# Patient Record
Sex: FEMALE | Race: WHITE | Employment: FULL TIME | URBAN - METROPOLITAN AREA
[De-identification: names, ages, dates, MRNs, and addresses within clinical notes are randomized per-mention and may not be internally consistent; named-entity substitution may affect disease eponyms.]

---

## 2017-10-03 ENCOUNTER — TELEPHONE (OUTPATIENT)
Dept: INTERNAL MEDICINE | Age: 32
End: 2017-10-03

## 2017-10-16 ENCOUNTER — TELEPHONE (OUTPATIENT)
Dept: INTERNAL MEDICINE | Age: 32
End: 2017-10-16

## 2018-10-03 ENCOUNTER — HOSPITAL ENCOUNTER (OUTPATIENT)
Age: 33
Discharge: HOME | End: 2018-10-03
Payer: COMMERCIAL

## 2018-10-03 ENCOUNTER — OFFICE VISIT (OUTPATIENT)
Dept: INTERNAL MEDICINE CLINIC | Age: 33
End: 2018-10-03
Payer: COMMERCIAL

## 2018-10-03 ENCOUNTER — HOSPITAL ENCOUNTER (OUTPATIENT)
Dept: GENERAL RADIOLOGY | Age: 33
Discharge: HOME | End: 2018-10-03
Payer: COMMERCIAL

## 2018-10-03 VITALS — WEIGHT: 152 LBS | DIASTOLIC BLOOD PRESSURE: 70 MMHG | SYSTOLIC BLOOD PRESSURE: 124 MMHG

## 2018-10-03 DIAGNOSIS — M54.50 CHRONIC MIDLINE LOW BACK PAIN WITHOUT SCIATICA: ICD-10-CM

## 2018-10-03 DIAGNOSIS — Z00.00 ANNUAL PHYSICAL EXAM: Primary | ICD-10-CM

## 2018-10-03 DIAGNOSIS — G89.29 CHRONIC MIDLINE LOW BACK PAIN WITHOUT SCIATICA: ICD-10-CM

## 2018-10-03 DIAGNOSIS — Z23 NEED FOR INFLUENZA VACCINATION: ICD-10-CM

## 2018-10-03 DIAGNOSIS — Z00.00 ANNUAL PHYSICAL EXAM: ICD-10-CM

## 2018-10-03 LAB
A/G RATIO: 1.5 (ref 1.1–2.2)
ALBUMIN SERPL-MCNC: 4.1 G/DL (ref 3.4–5)
ALP BLD-CCNC: 32 U/L (ref 40–129)
ALT SERPL-CCNC: 7 U/L (ref 10–40)
ANION GAP SERPL CALCULATED.3IONS-SCNC: 12 MMOL/L (ref 3–16)
AST SERPL-CCNC: 15 U/L (ref 15–37)
BASOPHILS ABSOLUTE: 0.1 K/UL (ref 0–0.2)
BASOPHILS RELATIVE PERCENT: 1.1 %
BILIRUB SERPL-MCNC: 0.3 MG/DL (ref 0–1)
BUN BLDV-MCNC: 14 MG/DL (ref 7–20)
CALCIUM SERPL-MCNC: 9.2 MG/DL (ref 8.3–10.6)
CHLORIDE BLD-SCNC: 104 MMOL/L (ref 99–110)
CHOLESTEROL, TOTAL: 180 MG/DL (ref 0–199)
CO2: 24 MMOL/L (ref 21–32)
CREAT SERPL-MCNC: 0.7 MG/DL (ref 0.6–1.1)
EOSINOPHILS ABSOLUTE: 0.2 K/UL (ref 0–0.6)
EOSINOPHILS RELATIVE PERCENT: 3.5 %
GFR AFRICAN AMERICAN: >60
GFR NON-AFRICAN AMERICAN: >60
GLOBULIN: 2.8 G/DL
GLUCOSE BLD-MCNC: 95 MG/DL (ref 70–99)
HCT VFR BLD CALC: 35 % (ref 36–48)
HDLC SERPL-MCNC: 69 MG/DL (ref 40–60)
HEMOGLOBIN: 11.6 G/DL (ref 12–16)
LDL CHOLESTEROL CALCULATED: 94 MG/DL
LYMPHOCYTES ABSOLUTE: 2.2 K/UL (ref 1–5.1)
LYMPHOCYTES RELATIVE PERCENT: 36.9 %
MCH RBC QN AUTO: 28.8 PG (ref 26–34)
MCHC RBC AUTO-ENTMCNC: 33.2 G/DL (ref 31–36)
MCV RBC AUTO: 86.9 FL (ref 80–100)
MONOCYTES ABSOLUTE: 0.5 K/UL (ref 0–1.3)
MONOCYTES RELATIVE PERCENT: 8.1 %
NEUTROPHILS ABSOLUTE: 3.1 K/UL (ref 1.7–7.7)
NEUTROPHILS RELATIVE PERCENT: 50.4 %
PDW BLD-RTO: 14.1 % (ref 12.4–15.4)
PLATELET # BLD: 229 K/UL (ref 135–450)
PMV BLD AUTO: 9.2 FL (ref 5–10.5)
POTASSIUM SERPL-SCNC: 4.2 MMOL/L (ref 3.5–5.1)
RBC # BLD: 4.02 M/UL (ref 4–5.2)
SODIUM BLD-SCNC: 140 MMOL/L (ref 136–145)
TOTAL PROTEIN: 6.9 G/DL (ref 6.4–8.2)
TRIGL SERPL-MCNC: 83 MG/DL (ref 0–150)
TSH SERPL DL<=0.05 MIU/L-ACNC: 2.28 UIU/ML (ref 0.27–4.2)
VLDLC SERPL CALC-MCNC: 17 MG/DL
WBC # BLD: 6.1 K/UL (ref 4–11)

## 2018-10-03 PROCEDURE — 99395 PREV VISIT EST AGE 18-39: CPT | Performed by: HOSPITALIST

## 2018-10-03 PROCEDURE — 90471 IMMUNIZATION ADMIN: CPT | Performed by: HOSPITALIST

## 2018-10-03 PROCEDURE — 90686 IIV4 VACC NO PRSV 0.5 ML IM: CPT | Performed by: HOSPITALIST

## 2018-10-03 PROCEDURE — 72100 X-RAY EXAM L-S SPINE 2/3 VWS: CPT

## 2018-10-03 ASSESSMENT — PATIENT HEALTH QUESTIONNAIRE - PHQ9
SUM OF ALL RESPONSES TO PHQ QUESTIONS 1-9: 0
2. FEELING DOWN, DEPRESSED OR HOPELESS: 0
1. LITTLE INTEREST OR PLEASURE IN DOING THINGS: 0
1. LITTLE INTEREST OR PLEASURE IN DOING THINGS: 0
SUM OF ALL RESPONSES TO PHQ9 QUESTIONS 1 & 2: 0
SUM OF ALL RESPONSES TO PHQ QUESTIONS 1-9: 0
SUM OF ALL RESPONSES TO PHQ9 QUESTIONS 1 & 2: 0
2. FEELING DOWN, DEPRESSED OR HOPELESS: 0
SUM OF ALL RESPONSES TO PHQ QUESTIONS 1-9: 0
SUM OF ALL RESPONSES TO PHQ QUESTIONS 1-9: 0

## 2018-10-03 ASSESSMENT — ENCOUNTER SYMPTOMS
BACK PAIN: 1
GASTROINTESTINAL NEGATIVE: 1
RESPIRATORY NEGATIVE: 1
EYES NEGATIVE: 1

## 2018-10-03 NOTE — PROGRESS NOTES
low back pain without sciatica  -     XR LUMBAR SPINE (2-3 VIEWS);  Future  -     External Referral To Physical Therapy  -     Ibuprofen/ Tylenol prn    Follow up as needed            Nellie Gavin M.D.   10/3/2018, 11:03 AM

## 2021-05-14 ENCOUNTER — TELEPHONE (OUTPATIENT)
Dept: INTERNAL MEDICINE CLINIC | Age: 36
End: 2021-05-14

## 2021-05-14 DIAGNOSIS — Z02.1 ENCOUNTER FOR PRE-EMPLOYMENT HEALTH SCREENING EXAMINATION: Primary | ICD-10-CM

## 2021-05-14 NOTE — TELEPHONE ENCOUNTER
Patient called in requesting labs be put in for new employment. Patient has not been seen since 2018. Labs:   Hep B surface antibody AB quant   Hep B surface AG   TB T Spot   And any other labs he recommends       Pls call and advise.

## 2021-05-20 DIAGNOSIS — Z02.1 ENCOUNTER FOR PRE-EMPLOYMENT HEALTH SCREENING EXAMINATION: ICD-10-CM

## 2021-05-20 LAB
A/G RATIO: 1.4 (ref 1.1–2.2)
ALBUMIN SERPL-MCNC: 4.2 G/DL (ref 3.4–5)
ALP BLD-CCNC: 39 U/L (ref 40–129)
ALT SERPL-CCNC: 6 U/L (ref 10–40)
ANION GAP SERPL CALCULATED.3IONS-SCNC: 13 MMOL/L (ref 3–16)
AST SERPL-CCNC: 15 U/L (ref 15–37)
BASOPHILS ABSOLUTE: 0.1 K/UL (ref 0–0.2)
BASOPHILS RELATIVE PERCENT: 0.8 %
BILIRUB SERPL-MCNC: 0.3 MG/DL (ref 0–1)
BUN BLDV-MCNC: 9 MG/DL (ref 7–20)
CALCIUM SERPL-MCNC: 9 MG/DL (ref 8.3–10.6)
CHLORIDE BLD-SCNC: 105 MMOL/L (ref 99–110)
CHOLESTEROL, TOTAL: 178 MG/DL (ref 0–199)
CO2: 21 MMOL/L (ref 21–32)
CREAT SERPL-MCNC: 0.8 MG/DL (ref 0.6–1.1)
EOSINOPHILS ABSOLUTE: 0.1 K/UL (ref 0–0.6)
EOSINOPHILS RELATIVE PERCENT: 2.2 %
GFR AFRICAN AMERICAN: >60
GFR NON-AFRICAN AMERICAN: >60
GLOBULIN: 3 G/DL
GLUCOSE BLD-MCNC: 85 MG/DL (ref 70–99)
HBV SURFACE AB TITR SER: 32.26 MIU/ML
HCT VFR BLD CALC: 35 % (ref 36–48)
HDLC SERPL-MCNC: 68 MG/DL (ref 40–60)
HEMOGLOBIN: 11 G/DL (ref 12–16)
HEPATITIS B SURFACE ANTIGEN INTERPRETATION: NORMAL
LDL CHOLESTEROL CALCULATED: 94 MG/DL
LYMPHOCYTES ABSOLUTE: 2.2 K/UL (ref 1–5.1)
LYMPHOCYTES RELATIVE PERCENT: 35.5 %
MCH RBC QN AUTO: 24.4 PG (ref 26–34)
MCHC RBC AUTO-ENTMCNC: 31.5 G/DL (ref 31–36)
MCV RBC AUTO: 77.4 FL (ref 80–100)
MONOCYTES ABSOLUTE: 0.5 K/UL (ref 0–1.3)
MONOCYTES RELATIVE PERCENT: 7.2 %
NEUTROPHILS ABSOLUTE: 3.4 K/UL (ref 1.7–7.7)
NEUTROPHILS RELATIVE PERCENT: 54.3 %
PDW BLD-RTO: 17.4 % (ref 12.4–15.4)
PLATELET # BLD: 230 K/UL (ref 135–450)
PMV BLD AUTO: 9.6 FL (ref 5–10.5)
POTASSIUM SERPL-SCNC: 4.3 MMOL/L (ref 3.5–5.1)
RBC # BLD: 4.52 M/UL (ref 4–5.2)
SODIUM BLD-SCNC: 139 MMOL/L (ref 136–145)
TOTAL PROTEIN: 7.2 G/DL (ref 6.4–8.2)
TRIGL SERPL-MCNC: 79 MG/DL (ref 0–150)
TSH SERPL DL<=0.05 MIU/L-ACNC: 2.39 UIU/ML (ref 0.27–4.2)
VLDLC SERPL CALC-MCNC: 16 MG/DL
WBC # BLD: 6.3 K/UL (ref 4–11)

## 2021-05-24 LAB
QUANTI TB GOLD PLUS: NEGATIVE
QUANTI TB1 MINUS NIL: 0.01 IU/ML (ref 0–0.34)
QUANTI TB2 MINUS NIL: 0.01 IU/ML (ref 0–0.34)
QUANTIFERON MITOGEN: >10 IU/ML
QUANTIFERON NIL: 0.03 IU/ML

## 2024-01-23 ENCOUNTER — OFFICE VISIT (OUTPATIENT)
Dept: OBSTETRICS AND GYNECOLOGY | Facility: CLINIC | Age: 39
End: 2024-01-23
Payer: COMMERCIAL

## 2024-01-23 VITALS
HEIGHT: 64 IN | BODY MASS INDEX: 29.37 KG/M2 | SYSTOLIC BLOOD PRESSURE: 119 MMHG | DIASTOLIC BLOOD PRESSURE: 84 MMHG | WEIGHT: 172 LBS

## 2024-01-23 DIAGNOSIS — Z31.9 PATIENT DESIRES PREGNANCY: ICD-10-CM

## 2024-01-23 DIAGNOSIS — Z01.419 WELL WOMAN EXAM: Primary | ICD-10-CM

## 2024-01-23 PROCEDURE — 99385 PREV VISIT NEW AGE 18-39: CPT | Performed by: NURSE PRACTITIONER

## 2024-01-23 PROCEDURE — 88175 CYTOPATH C/V AUTO FLUID REDO: CPT | Mod: TC,GCY,PORLAB | Performed by: NURSE PRACTITIONER

## 2024-01-23 PROCEDURE — 87624 HPV HI-RISK TYP POOLED RSLT: CPT | Performed by: NURSE PRACTITIONER

## 2024-01-23 ASSESSMENT — ENCOUNTER SYMPTOMS
MUSCULOSKELETAL NEGATIVE: 1
ENDOCRINE NEGATIVE: 1
EYES NEGATIVE: 0
MUSCULOSKELETAL NEGATIVE: 0
PSYCHIATRIC NEGATIVE: 1
CARDIOVASCULAR NEGATIVE: 0
ALLERGIC/IMMUNOLOGIC NEGATIVE: 0
DEPRESSION: 0
ALLERGIC/IMMUNOLOGIC NEGATIVE: 1
CARDIOVASCULAR NEGATIVE: 1
GASTROINTESTINAL NEGATIVE: 0
PSYCHIATRIC NEGATIVE: 0
NEUROLOGICAL NEGATIVE: 0
HEMATOLOGIC/LYMPHATIC NEGATIVE: 0
RESPIRATORY NEGATIVE: 0
OCCASIONAL FEELINGS OF UNSTEADINESS: 0
CONSTITUTIONAL NEGATIVE: 0
RESPIRATORY NEGATIVE: 1
HEMATOLOGIC/LYMPHATIC NEGATIVE: 1
ENDOCRINE NEGATIVE: 0
LOSS OF SENSATION IN FEET: 0
NEUROLOGICAL NEGATIVE: 1
EYES NEGATIVE: 1
CONSTITUTIONAL NEGATIVE: 1
GASTROINTESTINAL NEGATIVE: 1

## 2024-01-23 ASSESSMENT — PATIENT HEALTH QUESTIONNAIRE - PHQ9
2. FEELING DOWN, DEPRESSED OR HOPELESS: NOT AT ALL
SUM OF ALL RESPONSES TO PHQ9 QUESTIONS 1 & 2: 0
1. LITTLE INTEREST OR PLEASURE IN DOING THINGS: NOT AT ALL

## 2024-01-23 ASSESSMENT — LIFESTYLE VARIABLES
AUDIT-C TOTAL SCORE: 0
HOW MANY STANDARD DRINKS CONTAINING ALCOHOL DO YOU HAVE ON A TYPICAL DAY: PATIENT DOES NOT DRINK
HOW OFTEN DO YOU HAVE SIX OR MORE DRINKS ON ONE OCCASION: NEVER
SKIP TO QUESTIONS 9-10: 1
HOW OFTEN DO YOU HAVE A DRINK CONTAINING ALCOHOL: NEVER

## 2024-01-23 ASSESSMENT — PAIN SCALES - GENERAL: PAINLEVEL: 0-NO PAIN

## 2024-01-23 NOTE — PROGRESS NOTES
"Angie Lebron, APRN-CNP     Subjective   Cooper Linares is a 38 y.o. female who presents for annual exam.   Patient is a G0, P0 here for an annual checkup.  She would also like a Pap smear completed today    Patient has been off of birth control pills for approximately 2 years and has not conceived.  Her  is 40 years old and he has not fathered any children.  She has begun to follow her periods on a phone nehemias and has been trying to track ovulation to focus intercourse.  Past Medical History:   Diagnosis Date    Other specified health status     No pertinent past medical history     Past Surgical History:   Procedure Laterality Date    OTHER SURGICAL HISTORY  2022    Oral surgery       OB History          0    Para   0    Term   0       0    AB   0    Living   0         SAB   0    IAB   0    Ectopic   0    Multiple   0    Live Births   0               Patient's last menstrual period was 2024.      Review of Systems   Constitutional: Negative.    HENT: Negative.     Eyes: Negative.    Respiratory: Negative.     Cardiovascular: Negative.    Gastrointestinal: Negative.    Endocrine: Negative.    Genitourinary: Negative.    Musculoskeletal: Negative.    Skin: Negative.    Allergic/Immunologic: Negative.    Neurological: Negative.    Hematological: Negative.    Psychiatric/Behavioral: Negative.     All other systems reviewed and are negative.    Breast: No Complaints   Vaginal: No Complaints        Objective   /84   Ht 1.626 m (5' 4\")   Wt 78 kg (172 lb)   LMP 2024   BMI 29.52 kg/m²   Physical Exam  Constitutional:       Appearance: Normal appearance.   Genitourinary:      Vulva and rectum normal.        Right Adnexa: not tender and no mass present.     Left Adnexa: not tender and no mass present.     Uterus is not enlarged or tender.   Cardiovascular:      Rate and Rhythm: Normal rate.   Pulmonary:      Effort: Pulmonary effort is normal.      Breath sounds: Normal breath " sounds.   Musculoskeletal:         General: Normal range of motion.   Neurological:      General: No focal deficit present.      Mental Status: She is alert.   Skin:     General: Skin is warm and dry.   Vitals and nursing note reviewed.                 Assessment/Plan   Problem List Items Addressed This Visit    None  Visit Diagnoses         Codes    Well woman exam    -  Primary Z01.419    Relevant Orders    THINPREP PAP TEST    Patient desires pregnancy     Z31.9    Relevant Orders    Referral to Reproductive Endocrinology

## 2024-02-08 ENCOUNTER — PATIENT MESSAGE (OUTPATIENT)
Dept: INFECTIOUS DISEASES | Facility: CLINIC | Age: 39
End: 2024-02-08
Payer: COMMERCIAL

## 2024-02-08 LAB
CYTOLOGY CMNT CVX/VAG CYTO-IMP: NORMAL
HPV HR 12 DNA GENITAL QL NAA+PROBE: NEGATIVE
HPV HR GENOTYPES PNL CVX NAA+PROBE: NEGATIVE
HPV16 DNA SPEC QL NAA+PROBE: NEGATIVE
HPV18 DNA SPEC QL NAA+PROBE: NEGATIVE
LAB AP HPV GENOTYPE QUESTION: YES
LAB AP HPV HR: NORMAL
LAB AP PREVIOUS ABNORMAL HISTORY: NORMAL
LABORATORY COMMENT REPORT: NORMAL
LMP START DATE: NORMAL
PATH REPORT.TOTAL CANCER: NORMAL

## 2024-02-08 NOTE — PATIENT COMMUNICATION
Dr. White can you advise this my chart as Dr. Denton saw her 6/06/23.      She is going back to [place] 2/15-2/26. Pharmacy is [facility]. Did advise she due for 2nd Hep A.       Thanks

## 2024-02-12 DIAGNOSIS — Z71.84 COUNSELING ABOUT TRAVEL: Primary | ICD-10-CM

## 2024-02-12 RX ORDER — ATOVAQUONE AND PROGUANIL HYDROCHLORIDE 250; 100 MG/1; MG/1
1 TABLET, FILM COATED ORAL DAILY
Qty: 20 TABLET | Refills: 0 | Status: SHIPPED | OUTPATIENT
Start: 2024-02-12 | End: 2024-03-03

## 2024-02-13 ENCOUNTER — CLINICAL SUPPORT (OUTPATIENT)
Dept: INFECTIOUS DISEASES | Facility: CLINIC | Age: 39
End: 2024-02-13
Payer: COMMERCIAL

## 2024-02-13 DIAGNOSIS — Z23 NEED FOR HEPATITIS VACCINATION: ICD-10-CM

## 2024-02-13 DIAGNOSIS — R11.2 NAUSEA AND VOMITING IN ADULT: ICD-10-CM

## 2024-02-13 PROCEDURE — 90471 IMMUNIZATION ADMIN: CPT | Performed by: INTERNAL MEDICINE

## 2024-02-13 PROCEDURE — 90632 HEPA VACCINE ADULT IM: CPT | Performed by: INTERNAL MEDICINE

## 2024-02-13 RX ORDER — ONDANSETRON 4 MG/1
4 TABLET, FILM COATED ORAL EVERY 8 HOURS PRN
COMMUNITY

## 2024-02-19 NOTE — PROGRESS NOTES
Patient here for 2nd Hep A vaccine. Tolerated the injection well. States she and her  are going back to [place] again. Will need more malaria medicine  for her and her  and wondering if Dr. White would also order her some Zofran. Told her I would send the doctor a message. Jina Conrad RN

## 2024-06-03 ENCOUNTER — APPOINTMENT (OUTPATIENT)
Dept: ENDOCRINOLOGY | Facility: CLINIC | Age: 39
End: 2024-06-03
Payer: COMMERCIAL

## 2024-06-10 ENCOUNTER — APPOINTMENT (OUTPATIENT)
Dept: ENDOCRINOLOGY | Facility: CLINIC | Age: 39
End: 2024-06-10
Payer: COMMERCIAL

## 2024-06-21 ENCOUNTER — INITIAL PRENATAL (OUTPATIENT)
Dept: OBSTETRICS AND GYNECOLOGY | Facility: CLINIC | Age: 39
End: 2024-06-21
Payer: COMMERCIAL

## 2024-06-21 VITALS — WEIGHT: 169.2 LBS | BODY MASS INDEX: 29.04 KG/M2 | SYSTOLIC BLOOD PRESSURE: 115 MMHG | DIASTOLIC BLOOD PRESSURE: 71 MMHG

## 2024-06-21 DIAGNOSIS — O21.0 HYPEREMESIS OF PREGNANCY (HHS-HCC): ICD-10-CM

## 2024-06-21 DIAGNOSIS — O23.41 URINARY TRACT INFECTION IN MOTHER DURING FIRST TRIMESTER OF PREGNANCY (HHS-HCC): ICD-10-CM

## 2024-06-21 DIAGNOSIS — Z32.01 PREGNANCY TEST POSITIVE (HHS-HCC): ICD-10-CM

## 2024-06-21 DIAGNOSIS — O09.511 PRIMIGRAVIDA OF ADVANCED MATERNAL AGE IN FIRST TRIMESTER (HHS-HCC): ICD-10-CM

## 2024-06-21 DIAGNOSIS — Z3A.08 8 WEEKS GESTATION OF PREGNANCY (HHS-HCC): Primary | ICD-10-CM

## 2024-06-21 PROCEDURE — 87086 URINE CULTURE/COLONY COUNT: CPT | Performed by: OBSTETRICS & GYNECOLOGY

## 2024-06-21 RX ORDER — ONDANSETRON HYDROCHLORIDE 8 MG/1
8 TABLET, FILM COATED ORAL EVERY 8 HOURS PRN
Qty: 20 TABLET | Refills: 5 | Status: SHIPPED | OUTPATIENT
Start: 2024-06-21 | End: 2025-06-21

## 2024-06-21 ASSESSMENT — ENCOUNTER SYMPTOMS
CONSTITUTIONAL NEGATIVE: 0
RESPIRATORY NEGATIVE: 0
ENDOCRINE NEGATIVE: 0
HEMATOLOGIC/LYMPHATIC NEGATIVE: 0
PSYCHIATRIC NEGATIVE: 0
GASTROINTESTINAL NEGATIVE: 0
MUSCULOSKELETAL NEGATIVE: 0
CARDIOVASCULAR NEGATIVE: 0
EYES NEGATIVE: 0
NEUROLOGICAL NEGATIVE: 0
ALLERGIC/IMMUNOLOGIC NEGATIVE: 0

## 2024-06-21 ASSESSMENT — EDINBURGH POSTNATAL DEPRESSION SCALE (EPDS)
THE THOUGHT OF HARMING MYSELF HAS OCCURRED TO ME: NEVER
I HAVE BEEN ANXIOUS OR WORRIED FOR NO GOOD REASON: NO, NOT AT ALL
I HAVE FELT SCARED OR PANICKY FOR NO GOOD REASON: NO, NOT AT ALL
I HAVE BEEN SO UNHAPPY THAT I HAVE HAD DIFFICULTY SLEEPING: NOT AT ALL
I HAVE BLAMED MYSELF UNNECESSARILY WHEN THINGS WENT WRONG: NO, NEVER
I HAVE FELT SAD OR MISERABLE: NO, NOT AT ALL
TOTAL SCORE: 0
I HAVE BEEN ABLE TO LAUGH AND SEE THE FUNNY SIDE OF THINGS: AS MUCH AS I ALWAYS COULD
I HAVE LOOKED FORWARD WITH ENJOYMENT TO THINGS: AS MUCH AS I EVER DID
I HAVE BEEN SO UNHAPPY THAT I HAVE BEEN CRYING: NO, NEVER
THINGS HAVE BEEN GETTING ON TOP OF ME: NO, I HAVE BEEN COPING AS WELL AS EVER

## 2024-06-21 NOTE — PROGRESS NOTES
Subjective   Patient ID [id number]   Cooper Linares is a 38 y.o.  at 8w0d with a working estimated date of delivery of 2025, by Last Menstrual Period who presents for an initial prenatal visit. This pregnancy is planned.    Her pregnancy is complicated by:  Advanced maternal age    OB History    Para Term  AB Living   1 0 0 0 0 0   SAB IAB Ectopic Multiple Live Births   0 0 0 0 0      # Outcome Date GA Lbr Adrian/2nd Weight Sex Delivery Anes PTL Lv   1 Current                   Objective   Physical Exam  Weight: 76.7 kg (169 lb 3.2 oz)  Expected Total Weight Gain: Could not be calculated   Pregravid BMI: Could not be calculated  BP: 115/71      OBGyn Exam  See prenatal exam  Prenatal Labs  Ordered, Ultrasound genetic screening ordered as well    Assessment/Plan   Diagnoses and all orders for this visit:  8 weeks gestation of pregnancy ([facility])  -     C. Trachomatis / N. Gonorrhoeae, Amplified Detection  -     CBC Anemia Panel With Reflex,Pregnancy; Future  -     Hemoglobin A1C; Future  -     Hepatitis B Surface Antigen; Future  -     HIV 1/2 Antigen/Antibody Screen with Reflex to Confirmation; Future  -     Rubella Antibody, IgG; Future  -     Syphilis Screen with Reflex; Future  -     Type And Screen; Future  -     Urine Culture  -     Vitamin D 25-Hydroxy,Total (for eval of Vitamin D levels); Future  -     Avantha Foresight Carrier Screen; Future  -     Avantha Prequel Prenatal Screen; Future  -     US MAC OB imaging order; Future  Pregnancy test positive ([facility])  Hyperemesis of pregnancy ([facility])  -     ondansetron (Zofran) 8 mg tablet; Take 1 tablet (8 mg) by mouth every 8 hours if needed for nausea.  Primigravida of advanced maternal age in first trimester ([facility])  -     Referral to Genetics; Future    Prenatal Labs ordered  Daily prenatal vitamins prescribed  First trimester screening and second trimester screening discussed. Patient decided to Screening ordered  Having some nausea.   Zofran prescribed  Follow up in 2 weeks for return OB visit.

## 2024-06-23 LAB — BACTERIA UR CULT: ABNORMAL

## 2024-06-23 RX ORDER — CEPHALEXIN 500 MG/1
500 CAPSULE ORAL 4 TIMES DAILY
Qty: 40 CAPSULE | Refills: 0 | Status: SHIPPED | OUTPATIENT
Start: 2024-06-23 | End: 2024-07-03

## 2024-06-24 LAB — BACTERIA UR CULT: ABNORMAL

## 2024-07-09 ENCOUNTER — ROUTINE PRENATAL (OUTPATIENT)
Dept: OBSTETRICS AND GYNECOLOGY | Facility: CLINIC | Age: 39
End: 2024-07-09
Payer: COMMERCIAL

## 2024-07-09 ENCOUNTER — LAB (OUTPATIENT)
Dept: LAB | Facility: LAB | Age: 39
End: 2024-07-09
Payer: COMMERCIAL

## 2024-07-09 VITALS — SYSTOLIC BLOOD PRESSURE: 112 MMHG | BODY MASS INDEX: 29.46 KG/M2 | WEIGHT: 171.6 LBS | DIASTOLIC BLOOD PRESSURE: 60 MMHG

## 2024-07-09 DIAGNOSIS — E55.9 VITAMIN D DEFICIENCY: Primary | ICD-10-CM

## 2024-07-09 DIAGNOSIS — Z3A.10 10 WEEKS GESTATION OF PREGNANCY (HHS-HCC): ICD-10-CM

## 2024-07-09 DIAGNOSIS — Z3A.08 8 WEEKS GESTATION OF PREGNANCY (HHS-HCC): ICD-10-CM

## 2024-07-09 LAB
25(OH)D3 SERPL-MCNC: 27 NG/ML (ref 30–100)
ABO GROUP (TYPE) IN BLOOD: NORMAL
ANTIBODY SCREEN: NORMAL
ERYTHROCYTE [DISTWIDTH] IN BLOOD BY AUTOMATED COUNT: 16.4 % (ref 11.5–14.5)
EST. AVERAGE GLUCOSE BLD GHB EST-MCNC: 105 MG/DL
HBA1C MFR BLD: 5.3 %
HBV SURFACE AG SERPL QL IA: NONREACTIVE
HCT VFR BLD AUTO: 39.1 % (ref 36–46)
HGB BLD-MCNC: 12.8 G/DL (ref 12–16)
HIV 1+2 AB+HIV1 P24 AG SERPL QL IA: NONREACTIVE
MCH RBC QN AUTO: 27.4 PG (ref 26–34)
MCHC RBC AUTO-ENTMCNC: 32.7 G/DL (ref 32–36)
MCV RBC AUTO: 84 FL (ref 80–100)
NRBC BLD-RTO: 0 /100 WBCS (ref 0–0)
PLATELET # BLD AUTO: 234 X10*3/UL (ref 150–450)
RBC # BLD AUTO: 4.67 X10*6/UL (ref 4–5.2)
REFLEX ADDED, ANEMIA PANEL: NORMAL
RH FACTOR (ANTIGEN D): NORMAL
RUBV IGG SERPL IA-ACNC: 2.1 IA
RUBV IGG SERPL QL IA: POSITIVE
TREPONEMA PALLIDUM IGG+IGM AB [PRESENCE] IN SERUM OR PLASMA BY IMMUNOASSAY: NONREACTIVE
WBC # BLD AUTO: 8.4 X10*3/UL (ref 4.4–11.3)

## 2024-07-09 PROCEDURE — 83036 HEMOGLOBIN GLYCOSYLATED A1C: CPT

## 2024-07-09 PROCEDURE — 86317 IMMUNOASSAY INFECTIOUS AGENT: CPT

## 2024-07-09 PROCEDURE — 85027 COMPLETE CBC AUTOMATED: CPT

## 2024-07-09 PROCEDURE — 86850 RBC ANTIBODY SCREEN: CPT

## 2024-07-09 PROCEDURE — 86780 TREPONEMA PALLIDUM: CPT

## 2024-07-09 PROCEDURE — 86901 BLOOD TYPING SEROLOGIC RH(D): CPT

## 2024-07-09 PROCEDURE — 86900 BLOOD TYPING SEROLOGIC ABO: CPT

## 2024-07-09 PROCEDURE — 87389 HIV-1 AG W/HIV-1&-2 AB AG IA: CPT

## 2024-07-09 PROCEDURE — 87340 HEPATITIS B SURFACE AG IA: CPT

## 2024-07-09 PROCEDURE — 36415 COLL VENOUS BLD VENIPUNCTURE: CPT

## 2024-07-09 PROCEDURE — 0501F PRENATAL FLOW SHEET: CPT | Performed by: OBSTETRICS & GYNECOLOGY

## 2024-07-09 PROCEDURE — 82306 VITAMIN D 25 HYDROXY: CPT

## 2024-07-09 RX ORDER — CEPHRADINE 500 MG
10000 CAPSULE ORAL
Qty: 13 CAPSULE | Refills: 3 | Status: SHIPPED | OUTPATIENT
Start: 2024-07-14 | End: 2025-07-14

## 2024-07-09 ASSESSMENT — ENCOUNTER SYMPTOMS
HEMATOLOGIC/LYMPHATIC NEGATIVE: 0
GASTROINTESTINAL NEGATIVE: 0
MUSCULOSKELETAL NEGATIVE: 0
EYES NEGATIVE: 0
PSYCHIATRIC NEGATIVE: 0
ENDOCRINE NEGATIVE: 0
NEUROLOGICAL NEGATIVE: 0
RESPIRATORY NEGATIVE: 0
CONSTITUTIONAL NEGATIVE: 0
ALLERGIC/IMMUNOLOGIC NEGATIVE: 0
CARDIOVASCULAR NEGATIVE: 0

## 2024-07-09 ASSESSMENT — PATIENT HEALTH QUESTIONNAIRE - PHQ9
1. LITTLE INTEREST OR PLEASURE IN DOING THINGS: NOT AT ALL
2. FEELING DOWN, DEPRESSED OR HOPELESS: NOT AT ALL
SUM OF ALL RESPONSES TO PHQ9 QUESTIONS 1 AND 2: 0

## 2024-07-09 NOTE — PROGRESS NOTES
"Subjective   Patient ID [id number]   Cooper Linares is a 39 y.o.  at 10w4d with a working estimated date of delivery of 2025, by Last Menstrual Period who presents for a routine prenatal visit. She denies vaginal bleeding, leakage of fluid, decreased fetal movements, and contractions.    Her pregnancy is complicated by:  Advanced maternal age    Objective   Physical Exam  Weight: 77.8 kg (171 lb 9.6 oz), Pregravid BMI: Could not be calculated  Expected Total Weight Gain: Could not be calculated   BP: 112/60  Fetal Heart Rate: 160 Fundal Height (cm): 10 cm    Prenatal Labs  Urine dip:  No results found for: \"KETONESU\", \"GLUCOSEUR\", \"LEUKOCYTESUR\"  Lab Results   Component Value Date    HGB 12.8 2024    HCT 39.1 2024    ABO AB 2024    HEPBSAG Nonreactive 2024     Assessment/Plan   Diagnoses and all orders for this visit:  10 weeks gestation of pregnancy ([facility])  Continue prenatal vitamin.  Labs reviewed.  Order placed for anatomy scan at 20 weeks.  Order placed for NT scan 13 weeks.  Will do genetics then.  Genetics consult ordered  Vitamin D prescribed for vitamin D deficiency  Follow up in 4 weeks for a routine prenatal visit.  "

## 2024-07-17 LAB — SCAN RESULT: NORMAL

## 2024-07-19 LAB — SCAN RESULT: NORMAL

## 2024-07-25 ENCOUNTER — HOSPITAL ENCOUNTER (OUTPATIENT)
Dept: RADIOLOGY | Facility: CLINIC | Age: 39
Discharge: HOME | End: 2024-07-25
Payer: COMMERCIAL

## 2024-07-25 DIAGNOSIS — Z3A.08 8 WEEKS GESTATION OF PREGNANCY (HHS-HCC): ICD-10-CM

## 2024-07-25 PROCEDURE — 76813 OB US NUCHAL MEAS 1 GEST: CPT | Performed by: STUDENT IN AN ORGANIZED HEALTH CARE EDUCATION/TRAINING PROGRAM

## 2024-07-25 PROCEDURE — 76813 OB US NUCHAL MEAS 1 GEST: CPT

## 2024-07-30 ENCOUNTER — TELEPHONE (OUTPATIENT)
Dept: GENETICS | Facility: CLINIC | Age: 39
End: 2024-07-30
Payer: COMMERCIAL

## 2024-07-30 NOTE — TELEPHONE ENCOUNTER
Patient called back and requested appointment to be switched to virtual.     Sincerely,   Jayla Glover MS, [facility]  Licensed and Certified Genetic Counselor

## 2024-07-30 NOTE — TELEPHONE ENCOUNTER
Spoke with patient about upcoming genetic counseling appointment. We reviewed that she has had normal testing ordered by her OB, and per her OB, there are no additional concerns from a genetic standpoint. Gave patient the option to proceed with the appointment to review all results, switch appointment to virtual, or cancel the appointment all together. Patient will speak with her  and call me back to determine next steps.     Sincerely,   Jayla Glover MS, [facility]  Licensed and Certified Genetic Counselor   [phone]

## 2024-07-31 ENCOUNTER — TELEMEDICINE CLINICAL SUPPORT (OUTPATIENT)
Dept: GENETICS | Facility: CLINIC | Age: 39
End: 2024-07-31
Payer: COMMERCIAL

## 2024-07-31 DIAGNOSIS — O09.511 PRIMIGRAVIDA OF ADVANCED MATERNAL AGE IN FIRST TRIMESTER (HHS-HCC): ICD-10-CM

## 2024-07-31 DIAGNOSIS — Z31.5 ENCOUNTER FOR PROCREATIVE GENETIC COUNSELING: ICD-10-CM

## 2024-07-31 PROCEDURE — 96040 PR MEDICAL GENETICS COUNSELING EACH 30 MINUTES: CPT

## 2024-08-05 NOTE — PROGRESS NOTES
"Thank you for the referral of Ms. Cooper Linares. she is a 39 y.o.,  female who was 13 and 5/7 weeks pregnant at the time of our appointment with an EDC of 2025.  She was seen for genetic counseling with her , Ab, to discuss the prenatal genetic testing she has already had.    PAST HISTORY:   This is both the patient and her partner's first pregnancy. Her partner has hyperlipidemia and hypothyroidism, and no other medical concerns were reported.    Ultrasounds in the current pregnancy:  Nuchal translucency scan on 24:  \"Assigned GA12 w + 6 d  Assigned THERESA:2025  Impression  =========     A first trimester anatomic survey is being performed. The patient reports a risk reducing cfDNA result.     - Buchanan fetus with cardiac activity is seen  - Crown rump length is consistent with established THERESA  - Fetal organ survey is within normal limits for the gestational age  - Nuchal translucency is within normal limits measuring 1.4 mm  - No placental or adnexal abnormalities detected     Serum or cfDNA aneuploidy screening does not screen for malformations, atypical aneuploidies and genetic syndromes; therefore, a first trimester anatomic survey with NT  was performed and noted to be within normal limits. A first trimester evaluation cannot exclude all major fetal malformations, therefore a second trimester anatomic survey  will be scheduled between 19 - 20+ weeks gestation. Thank you for allowing us to participate in the care of your patient.\"    Prior aneuploidy screening:  Normal cell free DNA screening from [employer] reported on 2024:      Prior carrier screening:  Negative expanded carrier screening for 176 conditions reported on 2024:      Patient otherwise denies complications such as bleeding or cramping, exposures, infection/illness, and travel outside of the [place] since finding out she was pregnant.    FAMILY HISTORY  Medical and family histories were reviewed and the following " concerns regarding this pregnancy were apparent:  Patient:  -Adopted; no information on biological relatives.  Partner:    -Mother (living, 60s) has hypothyroidism.  -Father (living, 60s) has heart disease; he had a stent placement procedure in his 50s. He also has T2DM.  -Maternal first cousin once removed (, 40s)  of complications of MS.   The remainder of the family history was negative for birth defects, intellectual disability, recurrent pregnancy loss, or recognized inherited conditions.  Consanguinity denied.    REPORTED ETHNICITY/RACE:  Patient: [place]  Patient's partner: [place]    COUNSELING:    The following information was discussed with your patient:    The general population risk of 3-5% for birth defects in every pregnancy.  Chromosomes, genes, non-disjunction, and features of common aneuploidies associated with advanced maternal age were discussed, including Trisomy 13, Trisomy 18, Trisomy 21, and sex chromosome aneuploidies.  Based on the age of 39 y.o. at EDC alone, at this gestation, the risk for the fetus to have Down syndrome is approximately 1 in 95. The combined risk for the fetus to have Trisomy 21/18/13 is approximately 1 in 72. This does not include copy number variation, mosaicism, single gene disorders, translocations, and marker chromosomes.   The availability, benefits and limitations of ultrasound study. An ultrasound study is recommended between 11-14 weeks gestation including nuchal translucency measurement, which was done 24, and at 18-20 weeks gestation to survey fetal organs. An ultrasound study in the second trimester can identify 50% of Down syndrome cases and 90% of trisomy 18 or trisomy 13 cases.   Patient already had cell free DNA screening for aneuploidy. We discussed the methodology for this testing, which includes using cell-free DNA obtained from a mother's blood to screen for the presence of common chromosomal abnormalities. Depending on the laboratory  used, there is a >99% sensitivity for Down syndrome, at least 97.4% sensitivity for trisomy 18, and at least 87.5% sensitivity for trisomy 13.  Specificity for these trisomies is >99%. Although sensitivity and specificity rates are high, not all positive results are confirmed to be true positives. False negative results are rare. Therefore, in the event of an abnormal result, prenatal diagnosis through amniocentesis should be considered to confirm the findings.    The availability of diagnostic fetal chromosome analysis via chorionic villus sampling. The methods, benefits, limitations and risks of CVS including an approximate 1 in 200 risk of complications, including a 1 in 400 risk for miscarriage. The 0.1-1% risk of confined placental mosaicism in CVS was discussed. This test is available from 10-14 weeks gestation, and we reviewed that can confirm if the cell free DNA screening is a true negative.  The availability of diagnostic fetal chromosome analysis via amniocentesis. The methods, benefits, limitations and risks of amniocentesis and a 1 in 400 risk of complications, including a 1 in 800 risk of miscarriage. This test is available from 16+ weeks gestation, and can confirm if the cell free DNA screening is a true negative.  Per updated ACOG recommendations from 2017, we discussed the availability, benefits, and limitations of carrier screening for cystic fibrosis (CF), spinal muscular atrophy (SMA), and hemoglobinopathies/thalassemias. We reviewed the carrier frequencies of these conditions, varied clinical manifestations, and their autosomal recessive inheritance. The patient has already had negative carrier screening for hemoglobinopathies and thalassemias via CBC and hemoglobin electrophoresis and these results were reviewed. [place] screening is available for CF, hemoglobinopathies, and thalassemias, and SMA.    We also discussed the availability of more expanded/pan ethnic carrier screening for  additional, primarily autosomal recessive, conditions. We discussed the pros and cons of expanded carrier screening including the higher likelihood of being identified as a carrier for at least one condition on a larger panel.  If both members of the couple are found to be carriers for the same condition, there is a 25% chance for an affected child and prenatal diagnosis would be available. Approximately 2-4% of couples are found to be at risk to have a child with a genetic disorder based on this screening. In rare cases, expanded carrier screening results may have health implications for the tested individual.   Ms. Linares's carrier screening was negative, meaning that she has a low risk of being a carrier of any of the 176 conditions screened for. We reviewed that even if her partner is positive for a change in one of these genes, reproductive risk is suspected to be <1%. If her partner is a carrier of any of these conditions, there would be a 50% chance that their children are also carriers, and a 50% chance that his sister is a carrier. This information may be helpful for other family members to understand, and carrier screening remains available via a panel for Ms. Linares's partner.   Additional family history concerns:  Multifactorial inheritance. We reviewed that heart disease, high cholesterol, and hypothyroidism are all often considered multifactorial, meaning that they are due to some combination of genetic and environmental risk factors. While we do not have specific testing to assess exact risk, we know that relatives of affected individuals have an elevated risk when compared to the general population of being similarly affected. Cooper should inform any future pediatricians of this family history to allow for early intervention and to maximize outcomes.       DISPOSITION:  The patient stated that she understood the above information. She declined diagnostic testing to confirm the negative cell free DNA  result. Ms. Linares's partner, Dawson Burr ( 1983) elected to proceed with expanded carrier screening to match the initial panel that Ms. Linares had. He will go to a  outpatient lab for a blood draw. Results will take 2-3 weeks to return, and at that time I will contact him to disclose results and review next steps.     SHARIFA Nicole spent 40 minutes with the patient in direct video telecounseling.     Thank you for allowing us to participate in the care of your patient.  Should you or your patient have any questions, please do not hesitate to contact our office at [phone].    Sincerely,   Jayla Glover MS, CGC  Licensed and Certified Genetic Counselor     Reviewed by:  Dr. Jorge Heller MD  Maternal Fetal Medicine

## 2024-08-13 ENCOUNTER — APPOINTMENT (OUTPATIENT)
Dept: OBSTETRICS AND GYNECOLOGY | Facility: CLINIC | Age: 39
End: 2024-08-13
Payer: COMMERCIAL

## 2024-08-20 ENCOUNTER — ROUTINE PRENATAL (OUTPATIENT)
Dept: OBSTETRICS AND GYNECOLOGY | Facility: CLINIC | Age: 39
End: 2024-08-20
Payer: COMMERCIAL

## 2024-08-20 VITALS — WEIGHT: 172.4 LBS | SYSTOLIC BLOOD PRESSURE: 110 MMHG | BODY MASS INDEX: 29.59 KG/M2 | DIASTOLIC BLOOD PRESSURE: 72 MMHG

## 2024-08-20 DIAGNOSIS — Z3A.16 16 WEEKS GESTATION OF PREGNANCY (HHS-HCC): Primary | ICD-10-CM

## 2024-08-20 DIAGNOSIS — O09.522 ADVANCED MATERNAL AGE IN MULTIGRAVIDA, SECOND TRIMESTER (HHS-HCC): ICD-10-CM

## 2024-08-20 DIAGNOSIS — E55.9 VITAMIN D DEFICIENCY: ICD-10-CM

## 2024-08-20 PROCEDURE — 0501F PRENATAL FLOW SHEET: CPT | Performed by: OBSTETRICS & GYNECOLOGY

## 2024-08-20 RX ORDER — CHROMIUM PICOLINATE 200 MCG
1 TABLET ORAL DAILY
Qty: 90 EACH | Refills: 2 | Status: SHIPPED | OUTPATIENT
Start: 2024-08-20 | End: 2025-08-20

## 2024-08-20 ASSESSMENT — ENCOUNTER SYMPTOMS
GASTROINTESTINAL NEGATIVE: 0
CARDIOVASCULAR NEGATIVE: 0
PSYCHIATRIC NEGATIVE: 0
ALLERGIC/IMMUNOLOGIC NEGATIVE: 0
NEUROLOGICAL NEGATIVE: 0
RESPIRATORY NEGATIVE: 0
ENDOCRINE NEGATIVE: 0
EYES NEGATIVE: 0
HEMATOLOGIC/LYMPHATIC NEGATIVE: 0
CONSTITUTIONAL NEGATIVE: 0
MUSCULOSKELETAL NEGATIVE: 0

## 2024-08-20 NOTE — PROGRESS NOTES
"Subjective   Patient ID [id number]   Cooper Linares is a 39 y.o.  at 16w4d with a working estimated date of delivery of 2025, by Last Menstrual Period who presents for a routine prenatal visit. She denies vaginal bleeding, leakage of fluid, decreased fetal movements, or contractions.    Her pregnancy is complicated by:  Advanced maternal age    Objective   Physical Exam  Weight: 78.2 kg (172 lb 6.4 oz)  Expected Total Weight Gain: Could not be calculated   Pregravid BMI: Could not be calculated  BP: 110/72  Fetal Heart Rate: 148 Fundal Height (cm): 16 cm    Prenatal Labs  Urine dip:  No results found for: \"KETONESU\", \"GLUCOSEUR\", \"LEUKOCYTESUR\"    Lab Results   Component Value Date    HGB 12.8 2024    HCT 39.1 2024    ABO AB 2024    HEPBSAG Nonreactive 2024     Assessment/Plan   Diagnoses and all orders for this visit:  16 weeks gestation of pregnancy ([facility])  Vitamin D deficiency  -     calcium carbonate-vitamin D3 600 mg-10 mcg (400 unit) capsule; Take 1 capsule by mouth once daily.  Advanced maternal age in multigravida, second trimester ([facility])    Continue prenatal vitamin.  Prenatal carrier screening and genetic screening are negative.  Labs reviewed.  Vitamin D deficiency.  Patient started on oral vitamin D.  Plan some constipation.  Patient will start on MiraLAX  Anatomy ultrasound ordered  Follow up in 2 weeks for a routine prenatal visit.  "

## 2024-09-11 ENCOUNTER — HOSPITAL ENCOUNTER (OUTPATIENT)
Dept: RADIOLOGY | Facility: CLINIC | Age: 39
Discharge: HOME | End: 2024-09-11
Payer: COMMERCIAL

## 2024-09-11 DIAGNOSIS — Z3A.08 8 WEEKS GESTATION OF PREGNANCY (HHS-HCC): ICD-10-CM

## 2024-09-11 PROCEDURE — 76811 OB US DETAILED SNGL FETUS: CPT

## 2024-09-11 PROCEDURE — 76811 OB US DETAILED SNGL FETUS: CPT | Performed by: STUDENT IN AN ORGANIZED HEALTH CARE EDUCATION/TRAINING PROGRAM

## 2024-09-17 ENCOUNTER — ROUTINE PRENATAL (OUTPATIENT)
Dept: OBSTETRICS AND GYNECOLOGY | Facility: CLINIC | Age: 39
End: 2024-09-17
Payer: COMMERCIAL

## 2024-09-17 VITALS — WEIGHT: 175.2 LBS | SYSTOLIC BLOOD PRESSURE: 112 MMHG | BODY MASS INDEX: 30.07 KG/M2 | DIASTOLIC BLOOD PRESSURE: 70 MMHG

## 2024-09-17 DIAGNOSIS — O09.522 ADVANCED MATERNAL AGE IN MULTIGRAVIDA, SECOND TRIMESTER (HHS-HCC): ICD-10-CM

## 2024-09-17 DIAGNOSIS — Z3A.20 20 WEEKS GESTATION OF PREGNANCY (HHS-HCC): Primary | ICD-10-CM

## 2024-09-17 PROCEDURE — 0501F PRENATAL FLOW SHEET: CPT | Performed by: OBSTETRICS & GYNECOLOGY

## 2024-09-17 ASSESSMENT — ENCOUNTER SYMPTOMS
GASTROINTESTINAL NEGATIVE: 0
HEMATOLOGIC/LYMPHATIC NEGATIVE: 0
RESPIRATORY NEGATIVE: 0
CONSTITUTIONAL NEGATIVE: 0
ALLERGIC/IMMUNOLOGIC NEGATIVE: 0
MUSCULOSKELETAL NEGATIVE: 0
CARDIOVASCULAR NEGATIVE: 0
EYES NEGATIVE: 0
ENDOCRINE NEGATIVE: 0
NEUROLOGICAL NEGATIVE: 0
PSYCHIATRIC NEGATIVE: 0

## 2024-09-17 NOTE — PROGRESS NOTES
"Subjective   Patient ID [id number]   Cooper Linares is a 39 y.o.  at 20w4d with a working estimated date of delivery of 2025, by Last Menstrual Period who presents for a routine prenatal visit. She denies vaginal bleeding, leakage of fluid, decreased fetal movements, or contractions.    Her pregnancy is complicated by:   Advanced  Maternal age    Objective   Physical Exam  Weight: 79.5 kg (175 lb 3.2 oz)  Expected Total Weight Gain: Could not be calculated   Pregravid BMI: Could not be calculated  BP: 112/70  Fetal Heart Rate: 146 Fundal Height (cm): 21 cm    Prenatal Labs  Urine dip:  No results found for: \"KETONESU\", \"GLUCOSEUR\", \"LEUKOCYTESUR\"    Lab Results   Component Value Date    HGB 12.8 2024    HCT 39.1 2024    ABO AB 2024    HEPBSAG Nonreactive 2024     Assessment/Plan   Problem List Items Addressed This Visit    None  Visit Diagnoses       20 weeks gestation of pregnancy ([facility])    -  Primary    Advanced maternal age in multigravida, second trimester ([facility])                Continue prenatal vitamin.  Labs reviewed.  Follow-up ultrasound scheduled for anatomy  Recommended flu shot/COVID.  Will get it at work  Follow up in 4 weeks for a routine prenatal visit.  "

## 2024-09-26 ENCOUNTER — HOSPITAL ENCOUNTER (OUTPATIENT)
Dept: RADIOLOGY | Facility: CLINIC | Age: 39
Discharge: HOME | End: 2024-09-26
Payer: COMMERCIAL

## 2024-09-26 DIAGNOSIS — Z3A.08 8 WEEKS GESTATION OF PREGNANCY (HHS-HCC): ICD-10-CM

## 2024-09-26 PROCEDURE — 76816 OB US FOLLOW-UP PER FETUS: CPT

## 2024-10-15 ENCOUNTER — ROUTINE PRENATAL (OUTPATIENT)
Dept: OBSTETRICS AND GYNECOLOGY | Facility: CLINIC | Age: 39
End: 2024-10-15
Payer: COMMERCIAL

## 2024-10-15 VITALS — SYSTOLIC BLOOD PRESSURE: 107 MMHG | DIASTOLIC BLOOD PRESSURE: 62 MMHG | WEIGHT: 177.6 LBS | BODY MASS INDEX: 30.48 KG/M2

## 2024-10-15 DIAGNOSIS — O09.522 ADVANCED MATERNAL AGE IN MULTIGRAVIDA, SECOND TRIMESTER (HHS-HCC): ICD-10-CM

## 2024-10-15 DIAGNOSIS — Z3A.24 24 WEEKS GESTATION OF PREGNANCY (HHS-HCC): Primary | ICD-10-CM

## 2024-10-15 PROCEDURE — 0501F PRENATAL FLOW SHEET: CPT | Performed by: OBSTETRICS & GYNECOLOGY

## 2024-10-15 RX ORDER — OMEPRAZOLE 20 MG/1
20 CAPSULE, DELAYED RELEASE ORAL DAILY
Qty: 90 CAPSULE | Refills: 3 | Status: SHIPPED | OUTPATIENT
Start: 2024-10-15 | End: 2025-10-15

## 2024-10-15 ASSESSMENT — ENCOUNTER SYMPTOMS
CARDIOVASCULAR NEGATIVE: 0
RESPIRATORY NEGATIVE: 0
HEMATOLOGIC/LYMPHATIC NEGATIVE: 0
PSYCHIATRIC NEGATIVE: 0
ENDOCRINE NEGATIVE: 0
EYES NEGATIVE: 0
MUSCULOSKELETAL NEGATIVE: 0
CONSTITUTIONAL NEGATIVE: 0
GASTROINTESTINAL NEGATIVE: 0
NEUROLOGICAL NEGATIVE: 0
ALLERGIC/IMMUNOLOGIC NEGATIVE: 0

## 2024-10-15 NOTE — PROGRESS NOTES
Subjective   Patient ID [id number]   Cooper Linares is a 39 y.o.  at 24w4d with a working estimated date of delivery of 2025, by Last Menstrual Period who presents for a routine prenatal visit. She denies vaginal bleeding, leakage of fluid, decreased fetal movements, or contractions.  Was complaining of some reflux and heartburn  Her pregnancy is complicated by:  Advanced maternal age    Objective   Physical Exam  Weight: 80.6 kg (177 lb 9.6 oz)  Expected Total Weight Gain: Could not be calculated   Pregravid BMI: Could not be calculated  BP: 107/62  Fetal Heart Rate: 160 Fundal Height (cm): 24 cm    Prenatal Labs reviewed  Urine dip:      Lab Results   Component Value Date    HGB 12.8 2024    HCT 39.1 2024    ABO AB 2024    HEPBSAG Nonreactive 2024       Assessment/Plan   Diagnoses and all orders for this visit:  24 weeks gestation of pregnancy ([facility])  -     Follow Up In Obstetrics; Future  -     CBC Anemia Panel With Reflex,Pregnancy; Future  -     Glucose, 1 Hour Screen, Pregnancy; Future  -     Syphilis Screen with Reflex; Future  -     omeprazole (PriLOSEC) 20 mg DR capsule; Take 1 capsule (20 mg) by mouth once daily. Do not crush or chew.  Advanced maternal age in multigravida, second trimester ([facility])    Continue prenatal vitamin.  Labs reviewed.  Patient complaining of some reflux.  Prilosec ordered 20 mg p.o. daily  28-week labs ordered  Follow up in 4 weeks for a routine prenatal visit.

## 2024-10-25 ENCOUNTER — DOCUMENTATION (OUTPATIENT)
Dept: OBSTETRICS AND GYNECOLOGY | Facility: CLINIC | Age: 39
End: 2024-10-25
Payer: COMMERCIAL

## 2024-11-01 NOTE — PROGRESS NOTES
This request has been closed by the Patient's Pharmacy Benefit Manager. Call Pharmacy Customer Care at [phone]. PA Not Required.

## 2024-11-06 ENCOUNTER — LAB (OUTPATIENT)
Dept: LAB | Facility: LAB | Age: 39
End: 2024-11-06
Payer: COMMERCIAL

## 2024-11-06 DIAGNOSIS — Z3A.24 24 WEEKS GESTATION OF PREGNANCY (HHS-HCC): ICD-10-CM

## 2024-11-06 LAB
ERYTHROCYTE [DISTWIDTH] IN BLOOD BY AUTOMATED COUNT: 14.1 % (ref 11.5–14.5)
GLUCOSE 1H P 50 G GLC PO SERPL-MCNC: 123 MG/DL
HCT VFR BLD AUTO: 35.8 % (ref 36–46)
HGB BLD-MCNC: 11.6 G/DL (ref 12–16)
MCH RBC QN AUTO: 29.2 PG (ref 26–34)
MCHC RBC AUTO-ENTMCNC: 32.4 G/DL (ref 32–36)
MCV RBC AUTO: 90 FL (ref 80–100)
NRBC BLD-RTO: 0 /100 WBCS (ref 0–0)
PLATELET # BLD AUTO: 206 X10*3/UL (ref 150–450)
RBC # BLD AUTO: 3.97 X10*6/UL (ref 4–5.2)
REFLEX ADDED, ANEMIA PANEL: NORMAL
TREPONEMA PALLIDUM IGG+IGM AB [PRESENCE] IN SERUM OR PLASMA BY IMMUNOASSAY: NONREACTIVE
WBC # BLD AUTO: 10.5 X10*3/UL (ref 4.4–11.3)

## 2024-11-06 PROCEDURE — 86780 TREPONEMA PALLIDUM: CPT

## 2024-11-06 PROCEDURE — 85027 COMPLETE CBC AUTOMATED: CPT

## 2024-11-06 PROCEDURE — 36415 COLL VENOUS BLD VENIPUNCTURE: CPT

## 2024-11-06 PROCEDURE — 82947 ASSAY GLUCOSE BLOOD QUANT: CPT

## 2024-11-12 ENCOUNTER — ROUTINE PRENATAL (OUTPATIENT)
Dept: OBSTETRICS AND GYNECOLOGY | Facility: CLINIC | Age: 39
End: 2024-11-12
Payer: COMMERCIAL

## 2024-11-12 VITALS — BODY MASS INDEX: 31 KG/M2 | SYSTOLIC BLOOD PRESSURE: 106 MMHG | DIASTOLIC BLOOD PRESSURE: 62 MMHG | WEIGHT: 180.6 LBS

## 2024-11-12 DIAGNOSIS — Z3A.28 28 WEEKS GESTATION OF PREGNANCY (HHS-HCC): Primary | ICD-10-CM

## 2024-11-12 DIAGNOSIS — O09.513 PRIMIGRAVIDA OF ADVANCED MATERNAL AGE IN THIRD TRIMESTER (HHS-HCC): ICD-10-CM

## 2024-11-12 PROCEDURE — 0501F PRENATAL FLOW SHEET: CPT | Performed by: OBSTETRICS & GYNECOLOGY

## 2024-11-12 ASSESSMENT — EDINBURGH POSTNATAL DEPRESSION SCALE (EPDS)
I HAVE LOOKED FORWARD WITH ENJOYMENT TO THINGS: AS MUCH AS I EVER DID
I HAVE BEEN SO UNHAPPY THAT I HAVE BEEN CRYING: NO, NEVER
I HAVE BEEN ANXIOUS OR WORRIED FOR NO GOOD REASON: NO, NOT AT ALL
I HAVE BEEN ABLE TO LAUGH AND SEE THE FUNNY SIDE OF THINGS: AS MUCH AS I ALWAYS COULD
I HAVE FELT SAD OR MISERABLE: NO, NOT AT ALL
I HAVE BEEN SO UNHAPPY THAT I HAVE HAD DIFFICULTY SLEEPING: NOT AT ALL
THINGS HAVE BEEN GETTING ON TOP OF ME: NO, MOST OF THE TIME I HAVE COPED QUITE WELL
I HAVE BLAMED MYSELF UNNECESSARILY WHEN THINGS WENT WRONG: YES, SOME OF THE TIME
I HAVE FELT SCARED OR PANICKY FOR NO GOOD REASON: YES, QUITE A LOT
TOTAL SCORE: 6
THE THOUGHT OF HARMING MYSELF HAS OCCURRED TO ME: NEVER

## 2024-11-12 ASSESSMENT — ENCOUNTER SYMPTOMS
NEUROLOGICAL NEGATIVE: 0
ENDOCRINE NEGATIVE: 0
EYES NEGATIVE: 0
RESPIRATORY NEGATIVE: 0
GASTROINTESTINAL NEGATIVE: 0
MUSCULOSKELETAL NEGATIVE: 0
CARDIOVASCULAR NEGATIVE: 0
ALLERGIC/IMMUNOLOGIC NEGATIVE: 0
PSYCHIATRIC NEGATIVE: 0
CONSTITUTIONAL NEGATIVE: 0
HEMATOLOGIC/LYMPHATIC NEGATIVE: 0

## 2024-11-12 NOTE — PROGRESS NOTES
Ob Visit  24     SUBJECTIVE      HPI: Cooper Linares is a 39 y.o.  at 28w4d here for RPNV.    Active fetal movement.  No abdominal pain loss of fluid or vaginal bleeding    OBJECTIVE  Visit Vitals  /62   Wt 81.9 kg (180 lb 9.6 oz)   LMP 2024   BMI 31.00 kg/m²   OB Status Pregnant   Smoking Status Never   BSA 1.92 m²        ASSESSMENT & PLAN    Cooper Linares is a 39 y.o.  at 28w4d here for the following concerns we addressed today:    Problem List Items Addressed This Visit       28 weeks gestation of pregnancy ([facility]) - Primary    Overview     Desired provider in labor: [] CNM  [] Physician  [x] Blood Products: [] Yes, accepts [] No, needs counseling  [x] Initial BMI: Could not be calculated   [x] Prenatal Labs:   [x] Cervical Cancer Screening up to date  [x] Rh status: Positive  [x] Genetic Screening:    [x] NT US: (11-13 wks)  [x] Baby ASA (if indicated):  [x] Pregnancy dated by: Dates and ultrasound    [x] Anatomy US: (19-20 wks)  [] Federal Sterilization consent signed (if indicated):  [x] 1hr GCT at 24-28wks:  [] Rhogam (if indicated):   [x] Fetal Surveillance (if indicated) start 35 weeks  [] Tdap (27-32 wks, may be given up to 36 wks if initial window missed):   [] RSV (32-36 wks) (Sept. to end of ): Considering  [x] Flu Vaccine:    [] Breastfeeding:  [] Postpartum Birth control method:   [] GBS at 36 - 37 wks:  [x] 39 weeks discussion of IOL vs. Expectant management:  [x] Mode of delivery ( anticipated ): Vaginal           Current Assessment & Plan     Doing well.  Active fetal movement.  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  Discussed RSV.  Patient considering.  Discussed induction at 39 weeks electively.  Also discussed monitoring for the last 4 to 5 weeks of pregnancy due to advanced maternal age  Prenatal labs reviewed  Return to office 2 weeks         Primigravida of advanced maternal age in third trimester ([facility])    Current  Assessment & Plan     Plan on monitoring last 4 to 5 weeks of pregnancy  Repeat ultrasound 34 weeks              RTC in 2 weeks      Abebe Farias MD

## 2024-11-12 NOTE — ASSESSMENT & PLAN NOTE
Doing well.  Active fetal movement.  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  Discussed RSV.  Patient considering.  Discussed induction at 39 weeks electively.  Also discussed monitoring for the last 4 to 5 weeks of pregnancy due to advanced maternal age  Prenatal labs reviewed  Return to office 2 weeks

## 2024-12-03 ENCOUNTER — ROUTINE PRENATAL (OUTPATIENT)
Dept: OBSTETRICS AND GYNECOLOGY | Facility: CLINIC | Age: 39
End: 2024-12-03
Payer: COMMERCIAL

## 2024-12-03 VITALS — WEIGHT: 184 LBS | DIASTOLIC BLOOD PRESSURE: 77 MMHG | BODY MASS INDEX: 31.58 KG/M2 | SYSTOLIC BLOOD PRESSURE: 116 MMHG

## 2024-12-03 DIAGNOSIS — Z3A.28 28 WEEKS GESTATION OF PREGNANCY (HHS-HCC): ICD-10-CM

## 2024-12-03 DIAGNOSIS — Z3A.31 31 WEEKS GESTATION OF PREGNANCY (HHS-HCC): Primary | ICD-10-CM

## 2024-12-03 DIAGNOSIS — O09.513 PRIMIGRAVIDA OF ADVANCED MATERNAL AGE IN THIRD TRIMESTER (HHS-HCC): ICD-10-CM

## 2024-12-03 LAB
POC BLOOD, URINE: NEGATIVE
POC GLUCOSE, URINE: NEGATIVE MG/DL
POC KETONES, URINE: NEGATIVE MG/DL
POC LEUKOCYTES, URINE: NEGATIVE
POC NITRITE,URINE: NEGATIVE
POC PROTEIN, URINE: NEGATIVE MG/DL

## 2024-12-03 PROCEDURE — 81003 URINALYSIS AUTO W/O SCOPE: CPT | Mod: QW | Performed by: OBSTETRICS & GYNECOLOGY

## 2024-12-03 PROCEDURE — 0501F PRENATAL FLOW SHEET: CPT | Performed by: OBSTETRICS & GYNECOLOGY

## 2024-12-03 NOTE — PROGRESS NOTES
Ob Visit  24     SUBJECTIVE      HPI: Cooper Linares is a 39 y.o.  at 31w4d here for RPNV.  She has no contractions, no bleeding, or no LOF. Reports active normal fetal movement. Patient reports having some pain left inguinal area after getting in car.  Believes it is musculoskeletal.  Discussed local treatment and aspirin       OBJECTIVE  Visit Vitals  /77   Wt 83.5 kg (184 lb)   LMP 2024   BMI 31.58 kg/m²   OB Status Pregnant   Smoking Status Never   BSA 1.94 m²            ASSESSMENT & PLAN    Cooper Linares is a 39 y.o.  at 31w4d here for the following concerns we addressed today:    Problem List Items Addressed This Visit       31 weeks gestation of pregnancy ([facility]) - Primary    Overview     Desired provider in labor: [] CNM  [] Physician  [x] Blood Products: [] Yes, accepts [] No, needs counseling  [x] Initial BMI: Could not be calculated   [x] Prenatal Labs:   [x] Cervical Cancer Screening up to date  [x] Rh status: Positive  [x] Genetic Screening:    [x] NT US: (11-13 wks)  [x] Baby ASA (if indicated): Started  [x] Pregnancy dated by: Dates and ultrasound    [x] Anatomy US: (19-20 wks)  [] Federal Sterilization consent signed (if indicated):  [x] 1hr GCT at 24-28wks:  [] Rhogam (if indicated):   [x] Fetal Surveillance (if indicated) start 35 weeks  [] Tdap (27-32 wks, may be given up to 36 wks if initial window missed):   [x] RSV (32-36 wks) (Sept. to end of ): Considering  [x] Flu Vaccine:    [] Breastfeeding:  [] Postpartum Birth control method:   [] GBS at 36 - 37 wks:  [x] 39 weeks discussion of IOL vs. Expectant management:  [x] Mode of delivery ( anticipated ): Vaginal           Current Assessment & Plan     Patient doing well no complaints.  Baby active.  Discussed possible induction at 39 weeks due to advanced maternal age first baby.  High risk.  Plan on nonstress testing 4 to 6 weeks.  Fetal kick counts,  baby aspirin 81 mg twice daily         Relevant Orders    US  MAC OB imaging order    Follow Up In Obstetrics    Primigravida of advanced maternal age in third trimester ([facility])    Relevant Orders    US MAC OB imaging order    Follow Up In Obstetrics         RTC in 2 weeks.    Start NST testing.  Ultrasound for size and growth      Abebe Farias MD

## 2024-12-03 NOTE — ASSESSMENT & PLAN NOTE
Patient doing well no complaints.  Baby active.  Discussed possible induction at 39 weeks due to advanced maternal age first baby.  High risk.  Plan on nonstress testing 4 to 6 weeks.  Fetal kick counts,  baby aspirin 81 mg twice daily

## 2024-12-16 ENCOUNTER — APPOINTMENT (OUTPATIENT)
Dept: OBSTETRICS AND GYNECOLOGY | Facility: CLINIC | Age: 39
End: 2024-12-16
Payer: COMMERCIAL

## 2024-12-16 VITALS — WEIGHT: 189 LBS | SYSTOLIC BLOOD PRESSURE: 108 MMHG | DIASTOLIC BLOOD PRESSURE: 76 MMHG | BODY MASS INDEX: 32.44 KG/M2

## 2024-12-16 DIAGNOSIS — O09.513 PRIMIGRAVIDA OF ADVANCED MATERNAL AGE IN THIRD TRIMESTER (HHS-HCC): ICD-10-CM

## 2024-12-16 DIAGNOSIS — Z3A.33 33 WEEKS GESTATION OF PREGNANCY (HHS-HCC): Primary | ICD-10-CM

## 2024-12-16 PROBLEM — Z34.90 PREGNANCY (HHS-HCC): Status: ACTIVE | Noted: 2024-11-12

## 2024-12-16 PROCEDURE — 99213 OFFICE O/P EST LOW 20 MIN: CPT | Performed by: OBSTETRICS & GYNECOLOGY

## 2024-12-16 PROCEDURE — 59025 FETAL NON-STRESS TEST: CPT | Performed by: OBSTETRICS & GYNECOLOGY

## 2024-12-16 PROCEDURE — 90715 TDAP VACCINE 7 YRS/> IM: CPT | Performed by: OBSTETRICS & GYNECOLOGY

## 2024-12-16 PROCEDURE — 90471 IMMUNIZATION ADMIN: CPT | Performed by: OBSTETRICS & GYNECOLOGY

## 2024-12-16 NOTE — ASSESSMENT & PLAN NOTE
Patient feels well.  Baby is active.  ASA 81 mg p.o. twice daily  Ultrasound scheduled 1 week  Nonstress test reactive today  Return to office 1 week NST.  Fetal kick counts

## 2024-12-16 NOTE — PROCEDURES
Cooper Diazmary jane, a  at 33w3d with an THERESA of 2025, by Last Menstrual Period, was seen at [facility] for a nonstress test.    Non-Stress Test   Baseline Fetal Heart Rate for Non-Stress Test: 140 BPM  Variability in Waveform for Non-Stress Test: Moderate  Accelerations in Non-Stress Test: Yes  Decelerations in Non-Stress Test: None  Contractions in Non-Stress Test: Not present  Acoustic Stimulator for Non-Stress Test: No  Interpretation of Non-Stress Test   Interpretation of Non-Stress Test: Reactive  Comments on Non-Stress Test: Reassuring  NST for Multiple Fetuses: No

## 2024-12-16 NOTE — PROGRESS NOTES
Ob Visit  24     SUBJECTIVE  Feels well baby active    HPI: Cooper Linares is a 39 y.o.  at 33w3d here for RPNV.  She has no contractions, no bleeding, or no LOF. Reports yes normal fetal movement.     OBJECTIVE  Visit Vitals  /76   Wt 85.7 kg (189 lb)   LMP 2024   BMI 32.44 kg/m²   OB Status Pregnant   Smoking Status Never   BSA 1.97 m²      ASSESSMENT & PLAN    Cooper Linares is a 39 y.o.  at 33w3d here for the following concerns we addressed today:    Problem List Items Addressed This Visit       Primigravida of advanced maternal age in third trimester ([facility])    Relevant Orders    Fetal nonstress test    Follow Up In Obstetrics    33 weeks gestation of pregnancy ([facility]) - Primary    Overview     Desired provider in labor: [] CNM  [] Physician  [x] Blood Products: [] Yes, accepts [] No, needs counseling  [x] Initial BMI: Could not be calculated   [x] Prenatal Labs:   [x] Cervical Cancer Screening up to date  [x] Rh status: Positive  [x] Genetic Screening:    [x] NT US: (11-13 wks)  [x] Baby ASA (if indicated): Started  [x] Pregnancy dated by: Dates and ultrasound    [x] Anatomy US: (19-20 wks)  [] Federal Sterilization consent signed (if indicated):  [x] 1hr GCT at 24-28wks:  [] Rhogam (if indicated): AB+  [x] Fetal Surveillance (if indicated) start 35 weeks  [x] Tdap (27-32 wks, may be given up to 36 wks if initial window missed): 2024  [x] RSV (32-36 wks) (Sept. to end of ): Considering  [x] Flu Vaccine:    [] Breastfeeding:  [] Postpartum Birth control method:   [] GBS at 36 - 37 wks:  [x] 39 weeks discussion of IOL vs. Expectant management:  [x] Mode of delivery ( anticipated ): Vaginal           Current Assessment & Plan     Patient feels well.  Baby is active.  ASA 81 mg p.o. twice daily  Ultrasound scheduled 1 week  Nonstress test reactive today  Return to office 1 week NST.  Fetal kick counts         Relevant Orders    Fetal nonstress test    Follow Up In  Obstetrics         RTC in 1 weeks      Abebe Farias MD

## 2024-12-20 ENCOUNTER — DOCUMENTATION (OUTPATIENT)
Dept: OBSTETRICS AND GYNECOLOGY | Facility: CLINIC | Age: 39
End: 2024-12-20
Payer: COMMERCIAL

## 2024-12-23 ENCOUNTER — HOSPITAL ENCOUNTER (OUTPATIENT)
Dept: RADIOLOGY | Facility: HOSPITAL | Age: 39
Discharge: HOME | End: 2024-12-23
Payer: COMMERCIAL

## 2024-12-23 DIAGNOSIS — O09.513 PRIMIGRAVIDA OF ADVANCED MATERNAL AGE IN THIRD TRIMESTER (HHS-HCC): ICD-10-CM

## 2024-12-23 DIAGNOSIS — Z3A.31 31 WEEKS GESTATION OF PREGNANCY (HHS-HCC): ICD-10-CM

## 2024-12-23 PROCEDURE — 76816 OB US FOLLOW-UP PER FETUS: CPT | Performed by: OBSTETRICS & GYNECOLOGY

## 2024-12-23 PROCEDURE — 76819 FETAL BIOPHYS PROFIL W/O NST: CPT

## 2024-12-23 PROCEDURE — 76816 OB US FOLLOW-UP PER FETUS: CPT

## 2024-12-23 PROCEDURE — 76819 FETAL BIOPHYS PROFIL W/O NST: CPT | Performed by: OBSTETRICS & GYNECOLOGY

## 2024-12-30 ENCOUNTER — APPOINTMENT (OUTPATIENT)
Dept: OBSTETRICS AND GYNECOLOGY | Facility: CLINIC | Age: 39
End: 2024-12-30
Payer: COMMERCIAL

## 2024-12-30 VITALS — SYSTOLIC BLOOD PRESSURE: 112 MMHG | BODY MASS INDEX: 31.07 KG/M2 | DIASTOLIC BLOOD PRESSURE: 86 MMHG | WEIGHT: 181 LBS

## 2024-12-30 DIAGNOSIS — Z3A.35 35 WEEKS GESTATION OF PREGNANCY (HHS-HCC): ICD-10-CM

## 2024-12-30 DIAGNOSIS — O09.513 PRIMIGRAVIDA OF ADVANCED MATERNAL AGE IN THIRD TRIMESTER (HHS-HCC): ICD-10-CM

## 2024-12-30 PROCEDURE — 99213 OFFICE O/P EST LOW 20 MIN: CPT | Performed by: OBSTETRICS & GYNECOLOGY

## 2024-12-30 PROCEDURE — 59025 FETAL NON-STRESS TEST: CPT | Performed by: OBSTETRICS & GYNECOLOGY

## 2024-12-30 NOTE — PROCEDURES
Cooper Linares, a  at 35w3d with an THERESA of 2025, by Last Menstrual Period, was seen at [facility] for a nonstress test.    Active fetal movement  Baseline 138  Positive accelerations 16 to 25 bpm  No decelerations  No contractions  No acoustic stimulation  Interpretation of Non-Stress Test   Interpretation of Non-Stress Test: Reactive  NST for Multiple Fetuses: No

## 2024-12-30 NOTE — PROGRESS NOTES
Ob Visit  24     SUBJECTIVE      HPI: Cooper Linares is a 39 y.o.  at 35w3d here for RPNV.  She has no contractions, no bleeding, or no LOF. Reports yes normal fetal movement. Patient reports no issues at present.  Currently is moving into a new house today     OBJECTIVE  Visit Vitals  /86   Wt 82.1 kg (181 lb)   LMP 2024   BMI 31.07 kg/m²   OB Status Pregnant   Smoking Status Never   BSA 1.93 m²      ASSESSMENT & PLAN    Cooper Linares is a 39 y.o.  at 35w3d here for the following concerns we addressed today:    Problem List Items Addressed This Visit       Primigravida of advanced maternal age in third trimester ([facility])    35 weeks gestation of pregnancy ([facility])    Overview     Desired provider in labor: [] CNM  [] Physician  [x] Blood Products: [] Yes, accepts [] No, needs counseling  [x] Initial BMI: Could not be calculated   [x] Prenatal Labs:   [x] Cervical Cancer Screening up to date  [x] Rh status: Positive  [x] Genetic Screening:    [x] NT US: (11-13 wks)  [x] Baby ASA (if indicated): Started  [x] Pregnancy dated by: Dates and ultrasound    [x] Anatomy US: (19-20 wks)  [] Federal Sterilization consent signed (if indicated):  [x] 1hr GCT at 24-28wks:  [] Rhogam (if indicated): AB+  [x] Fetal Surveillance (if indicated) start 35 weeks  [x] Tdap (27-32 wks, may be given up to 36 wks if initial window missed): 2024  [x] RSV (32-36 wks) (Sept. to end of ): Considering  [x] Flu Vaccine:    [] Breastfeeding:  [] Postpartum Birth control method:   [] GBS at 36 - 37 wks:  [x] 39 weeks discussion of IOL vs. Expectant management:  [x] Mode of delivery ( anticipated ): Vaginal          Nonstress test reactive.  Continue fetal kick counts.  Ultrasound last week reviewed and unremarkable.  Plan induction at 39 weeks    RTC in 1 weeks      Abebe Farias MD

## 2025-01-07 ENCOUNTER — ROUTINE PRENATAL (OUTPATIENT)
Dept: OBSTETRICS AND GYNECOLOGY | Facility: CLINIC | Age: 40
End: 2025-01-07
Payer: COMMERCIAL

## 2025-01-07 VITALS — BODY MASS INDEX: 32.1 KG/M2 | WEIGHT: 187 LBS | SYSTOLIC BLOOD PRESSURE: 120 MMHG | DIASTOLIC BLOOD PRESSURE: 80 MMHG

## 2025-01-07 DIAGNOSIS — Z3A.36 36 WEEKS GESTATION OF PREGNANCY (HHS-HCC): Primary | ICD-10-CM

## 2025-01-07 DIAGNOSIS — O09.523 MULTIGRAVIDA OF ADVANCED MATERNAL AGE IN THIRD TRIMESTER (HHS-HCC): ICD-10-CM

## 2025-01-07 PROCEDURE — 81003 URINALYSIS AUTO W/O SCOPE: CPT | Mod: QW | Performed by: OBSTETRICS & GYNECOLOGY

## 2025-01-07 PROCEDURE — 59025 FETAL NON-STRESS TEST: CPT | Performed by: OBSTETRICS & GYNECOLOGY

## 2025-01-07 PROCEDURE — 87081 CULTURE SCREEN ONLY: CPT | Performed by: OBSTETRICS & GYNECOLOGY

## 2025-01-07 PROCEDURE — 87491 CHLMYD TRACH DNA AMP PROBE: CPT | Performed by: OBSTETRICS & GYNECOLOGY

## 2025-01-07 PROCEDURE — 0501F PRENATAL FLOW SHEET: CPT | Performed by: OBSTETRICS & GYNECOLOGY

## 2025-01-07 NOTE — PROGRESS NOTES
Ob Visit  25     SUBJECTIVE      HPI: Cooper Linares is a 39 y.o.  at 36w4d here for RPNV.  She has no contractions, no no bleeding, or no LOF. Reports active normal fetal movement    OBJECTIVE  Visit Vitals  /80   Wt 84.8 kg (187 lb)   LMP 2024   BMI 32.10 kg/m²   OB Status Pregnant   Smoking Status Never   BSA 1.96 m²            ASSESSMENT & PLAN    Cooper Linares is a 39 y.o.  at 36w4d here for the following concerns we addressed today:    Problem List Items Addressed This Visit    None    Patient did have an episode of dizziness momentarily.  Outside has been feeling well.  Baby is active,  Nonstress test reactive  Discussed induction with patient 39 to 40 weeks.  Risks and benefits reviewed.  Return to office 1 week.  Fetal kick counts.  Nonstress test weekly    RTC in 1 weeks      Abebe Farias MD

## 2025-01-07 NOTE — ASSESSMENT & PLAN NOTE
Patient did have an episode of dizziness momentarily.  Outside has been feeling well.  Baby is active,  Nonstress test reactive  Discussed induction with patient 39 to 40 weeks.  Risks and benefits reviewed.  Return to office 1 week.  Fetal kick counts.  Nonstress test weekly

## 2025-01-07 NOTE — PROCEDURES
Cooper Linares, a  at 36w4d with an THERESA of 2025, by Last Menstrual Period, [facility]    Non-Stress Test   Baseline Fetal Heart Rate for Non-Stress Test: 140 BPM  Variability in Waveform for Non-Stress Test: Moderate  Accelerations in Non-Stress Test: Yes  Decelerations in Non-Stress Test: None  Contractions in Non-Stress Test: Not present  Acoustic Stimulator for Non-Stress Test: No  Interpretation of Non-Stress Test   Interpretation of Non-Stress Test: Reactive  NST for Multiple Fetuses: No

## 2025-01-08 DIAGNOSIS — O09.513 PRIMIGRAVIDA OF ADVANCED MATERNAL AGE IN THIRD TRIMESTER (HHS-HCC): ICD-10-CM

## 2025-01-08 DIAGNOSIS — Z3A.39 39 WEEKS GESTATION OF PREGNANCY (HHS-HCC): Primary | ICD-10-CM

## 2025-01-08 LAB
C TRACH RRNA SPEC QL NAA+PROBE: NEGATIVE
N GONORRHOEA DNA SPEC QL PROBE+SIG AMP: NEGATIVE

## 2025-01-09 DIAGNOSIS — Z3A.39 39 WEEKS GESTATION OF PREGNANCY (HHS-HCC): Primary | ICD-10-CM

## 2025-01-11 LAB — GP B STREP GENITAL QL CULT: NORMAL

## 2025-01-13 ENCOUNTER — APPOINTMENT (OUTPATIENT)
Dept: OBSTETRICS AND GYNECOLOGY | Facility: CLINIC | Age: 40
End: 2025-01-13
Payer: COMMERCIAL

## 2025-01-13 VITALS — DIASTOLIC BLOOD PRESSURE: 88 MMHG | WEIGHT: 187 LBS | BODY MASS INDEX: 32.1 KG/M2 | SYSTOLIC BLOOD PRESSURE: 131 MMHG

## 2025-01-13 DIAGNOSIS — Z3A.37 37 WEEKS GESTATION OF PREGNANCY (HHS-HCC): Primary | ICD-10-CM

## 2025-01-13 DIAGNOSIS — O09.513 PRIMIGRAVIDA OF ADVANCED MATERNAL AGE IN THIRD TRIMESTER (HHS-HCC): ICD-10-CM

## 2025-01-13 NOTE — PROGRESS NOTES
Ob Visit  25     SUBJECTIVE      HPI: Cooper Linares is a 39 y.o.  at 37w3d here for RPNV.  She has no contractions, no bleeding, or no LOF. Reports active normal fetal movement. Patient reports feels well       OBJECTIVE  Visit Vitals  /88   Wt 84.8 kg (187 lb)   LMP 2024   BMI 32.10 kg/m²   OB Status Pregnant   Smoking Status Never   BSA 1.96 m²            ASSESSMENT & PLAN    Cooper Linares is a 39 y.o.  at 37w3d here for the following concerns we addressed today:    Problem List Items Addressed This Visit       Primigravida of advanced maternal age in third trimester ([facility])    37 weeks gestation of pregnancy ([facility])    Overview     Desired provider in labor: [] CNM  [] Physician  [x] Blood Products: [] Yes, accepts [] No, needs counseling  [x] Initial BMI: Could not be calculated   [x] Prenatal Labs:   [x] Cervical Cancer Screening up to date  [x] Rh status: Positive  [x] Genetic Screening:    [x] NT US: (11-13 wks)  [x] Baby ASA (if indicated): Started  [x] Pregnancy dated by: Dates and ultrasound    [x] Anatomy US: (19-20 wks)  [] Federal Sterilization consent signed (if indicated):  [x] 1hr GCT at 24-28wks:  [] Rhogam (if indicated): AB+  [x] Fetal Surveillance (if indicated) start 35 weeks  [x] Tdap (27-32 wks, may be given up to 36 wks if initial window missed): 2024  [x] RSV (32-36 wks) (Sept. to end of ): Considering  [x] Flu Vaccine:    [x] Breastfeeding:  [] Postpartum Birth control method:   [] GBS at 36 - 37 wks:  [x] 39 weeks discussion of IOL vs. Expectant management:  [x] Mode of delivery ( anticipated ): Vaginal           Current Assessment & Plan     Feels well.  Active fetal movement  Nonstress test today is reactive  Discussed saving cord blood.  Patient will obtain kit if they wish to proceed  Return to office 1 week for nonstress test.  Plan induction at 39 to 40 weeks            Patient considering cord blood collection.  Will bring kit to the  hospital for delivery  Nonstress test reactive  Continue fetal kick counts  RTC in 1 weeks      Abebe Farias MD

## 2025-01-13 NOTE — PROCEDURES
Cooper Linares, a  at 37w3d with an THERESA of 2025, by Last Menstrual Period, was seen at [facility] for a nonstress test.    Non-Stress Test   Baseline Fetal Heart Rate for Non-Stress Test: 138 BPM  Variability in Waveform for Non-Stress Test: Moderate  Accelerations in Non-Stress Test: Yes  Decelerations in Non-Stress Test: None  Contractions in Non-Stress Test: Not present  Acoustic Stimulator for Non-Stress Test: No  Interpretation of Non-Stress Test   Interpretation of Non-Stress Test: Reactive  NST for Multiple Fetuses: No

## 2025-01-13 NOTE — ASSESSMENT & PLAN NOTE
Feels well.  Active fetal movement  Nonstress test today is reactive  Discussed saving cord blood.  Patient will obtain kit if they wish to proceed  Return to office 1 week for nonstress test.  Plan induction at 39 to 40 weeks

## 2025-01-20 ENCOUNTER — APPOINTMENT (OUTPATIENT)
Dept: OBSTETRICS AND GYNECOLOGY | Facility: CLINIC | Age: 40
End: 2025-01-20
Payer: COMMERCIAL

## 2025-01-20 VITALS — WEIGHT: 187 LBS | DIASTOLIC BLOOD PRESSURE: 83 MMHG | SYSTOLIC BLOOD PRESSURE: 127 MMHG | BODY MASS INDEX: 32.1 KG/M2

## 2025-01-20 DIAGNOSIS — O09.513 PRIMIGRAVIDA OF ADVANCED MATERNAL AGE IN THIRD TRIMESTER (HHS-HCC): Primary | ICD-10-CM

## 2025-01-20 DIAGNOSIS — Z3A.38 38 WEEKS GESTATION OF PREGNANCY (HHS-HCC): ICD-10-CM

## 2025-01-20 NOTE — PROGRESS NOTES
Ob Visit  25     SUBJECTIVE      HPI: Cooper Linares is a 39 y.o.  at 38w3d here for RPNV.  She has no contractions, no bleeding, or no LOF. Reports active normal fetal movement. Patient reports no complaints       OBJECTIVE  Visit Vitals  /83   Wt 84.8 kg (187 lb)   LMP 2024   BMI 32.10 kg/m²   OB Status Pregnant   Smoking Status Never   BSA 1.96 m²        ASSESSMENT & PLAN    Cooper Linares is a 39 y.o.  at 38w3d here for the following concerns we addressed today:    Problem List Items Addressed This Visit       Primigravida of advanced maternal age in third trimester ([facility]) - Primary    38 weeks gestation of pregnancy ([facility])    Overview     Desired provider in labor: [] CNM  [] Physician  [x] Blood Products: [] Yes, accepts [] No, needs counseling  [x] Initial BMI: Could not be calculated   [x] Prenatal Labs:   [x] Cervical Cancer Screening up to date  [x] Rh status: Positive  [x] Genetic Screening:    [x] NT US: (11-13 wks)  [x] Baby ASA (if indicated): Started  [x] Pregnancy dated by: Dates and ultrasound    [x] Anatomy US: (19-20 wks)  [] Federal Sterilization consent signed (if indicated):  [x] 1hr GCT at 24-28wks:  [] Rhogam (if indicated): AB+  [x] Fetal Surveillance (if indicated) start 35 weeks  [x] Tdap (27-32 wks, may be given up to 36 wks if initial window missed): 2024  [x] RSV (32-36 wks) (Sept. to end of ): Considering  [x] Flu Vaccine:    [x] Breastfeeding:  [] Postpartum Birth control method:   [x] GBS at 36 - 37 wks:  [x] 39 weeks discussion of IOL vs. Expectant management:  [x] Mode of delivery ( anticipated ): Vaginal              RTC in 1 week if not delivered.  Nonstress test.  Has induction scheduled at 39 weeks  NST reactive    Abebe Farias MD

## 2025-01-20 NOTE — PROCEDURES
Cooper Linares, a  at 38w3d with an THERESA of 2025, by Last Menstrual Period, was seen at [facility] for a nonstress test.    Non-Stress Test   Baseline Fetal Heart Rate for Non-Stress Test: 126 BPM  Positive accelerations 6 to 25 bpm  No decelerations  Active fetal movement  Reactive